# Patient Record
Sex: FEMALE | Race: WHITE | NOT HISPANIC OR LATINO | URBAN - METROPOLITAN AREA
[De-identification: names, ages, dates, MRNs, and addresses within clinical notes are randomized per-mention and may not be internally consistent; named-entity substitution may affect disease eponyms.]

---

## 2017-01-19 ENCOUNTER — APPOINTMENT (OUTPATIENT)
Dept: URBAN - METROPOLITAN AREA SURGERY 9 | Age: 72
Setting detail: DERMATOLOGY
End: 2017-01-20

## 2017-01-19 DIAGNOSIS — L91.8 OTHER HYPERTROPHIC DISORDERS OF THE SKIN: ICD-10-CM

## 2017-01-19 DIAGNOSIS — L82.1 OTHER SEBORRHEIC KERATOSIS: ICD-10-CM

## 2017-01-19 DIAGNOSIS — Z85.828 PERSONAL HISTORY OF OTHER MALIGNANT NEOPLASM OF SKIN: ICD-10-CM

## 2017-01-19 DIAGNOSIS — D17 BENIGN LIPOMATOUS NEOPLASM: ICD-10-CM

## 2017-01-19 DIAGNOSIS — L43.8 OTHER LICHEN PLANUS: ICD-10-CM

## 2017-01-19 DIAGNOSIS — L57.0 ACTINIC KERATOSIS: ICD-10-CM

## 2017-01-19 PROBLEM — D48.5 NEOPLASM OF UNCERTAIN BEHAVIOR OF SKIN: Status: ACTIVE | Noted: 2017-01-19

## 2017-01-19 PROBLEM — D17.20 BENIGN LIPOMATOUS NEOPLASM OF SKIN AND SUBCUTANEOUS TISSUE OF UNSPECIFIED LIMB: Status: ACTIVE | Noted: 2017-01-19

## 2017-01-19 PROCEDURE — OTHER BIOPSY BY SHAVE METHOD: OTHER

## 2017-01-19 PROCEDURE — 88305 TISSUE EXAM BY PATHOLOGIST: CPT | Mod: TC

## 2017-01-19 PROCEDURE — OTHER REASSURANCE: OTHER

## 2017-01-19 PROCEDURE — 11100: CPT

## 2017-01-19 PROCEDURE — 99213 OFFICE O/P EST LOW 20 MIN: CPT | Mod: 25

## 2017-01-19 PROCEDURE — OTHER OTHER: OTHER

## 2017-01-19 PROCEDURE — OTHER PATHOLOGY BILLING: OTHER

## 2017-01-19 ASSESSMENT — LOCATION SIMPLE DESCRIPTION DERM
LOCATION SIMPLE: POSTERIOR SCALP
LOCATION SIMPLE: UPPER BACK
LOCATION SIMPLE: LEFT NOSE
LOCATION SIMPLE: LEFT PRETIBIAL REGION
LOCATION SIMPLE: RIGHT POSTERIOR AXILLA

## 2017-01-19 ASSESSMENT — LOCATION ZONE DERM
LOCATION ZONE: SCALP
LOCATION ZONE: LEG
LOCATION ZONE: AXILLAE
LOCATION ZONE: NOSE
LOCATION ZONE: TRUNK

## 2017-01-19 ASSESSMENT — LOCATION DETAILED DESCRIPTION DERM
LOCATION DETAILED: LEFT NASAL ALA
LOCATION DETAILED: LEFT DISTAL PRETIBIAL REGION
LOCATION DETAILED: INFERIOR THORACIC SPINE
LOCATION DETAILED: POSTERIOR MID-PARIETAL SCALP
LOCATION DETAILED: RIGHT POSTERIOR AXILLA

## 2017-01-19 NOTE — PROCEDURE: REASSURANCE
Detail Level: Simple
Include Location In Plan?: No
Detail Level: Generalized
Detail Level: Detailed
Additional Note: On top of superficial lipoma

## 2017-01-19 NOTE — PROCEDURE: OTHER
Detail Level: Simple
Other (Free Text): Left leg did clear.
Note Text (......Xxx Chief Complaint.): This diagnosis correlates with the

## 2017-03-08 ENCOUNTER — APPOINTMENT (OUTPATIENT)
Dept: URBAN - METROPOLITAN AREA SURGERY 9 | Age: 72
Setting detail: DERMATOLOGY
End: 2017-03-09

## 2017-03-08 VITALS — HEART RATE: 72 BPM | SYSTOLIC BLOOD PRESSURE: 132 MMHG | RESPIRATION RATE: 15 BRPM | DIASTOLIC BLOOD PRESSURE: 80 MMHG

## 2017-03-08 PROBLEM — C44.311 BASAL CELL CARCINOMA OF SKIN OF NOSE: Status: ACTIVE | Noted: 2017-03-08

## 2017-03-08 PROCEDURE — 17312 MOHS ADDL STAGE: CPT

## 2017-03-08 PROCEDURE — 17311 MOHS 1 STAGE H/N/HF/G: CPT

## 2017-03-08 PROCEDURE — OTHER CONSULTATION FOR MOHS SURGERY: OTHER

## 2017-03-08 PROCEDURE — OTHER MOHS SURGERY: OTHER

## 2017-03-08 PROCEDURE — OTHER RETURN TO REFERRING PROVIDER: OTHER

## 2017-03-08 NOTE — PROCEDURE: MOHS SURGERY
Body Location Override (Optional - Billing Will Still Be Based On Selected Body Map Location If Applicable): Left nasal supratip

## 2017-03-08 NOTE — PROCEDURE: CONSULTATION FOR MOHS SURGERY
Detail Level: Detailed
Body Location Override (Optional - Billing Will Still Be Based On Selected Body Map Location If Applicable): Left nasal supratip
X Size Of Lesion In Cm (Optional): 0
Name Of The Referring Provider For Procedure: Dr. Ellis
Incorporate Mauc In Note: No
Size Of Lesion: -

## 2017-03-17 ENCOUNTER — APPOINTMENT (OUTPATIENT)
Dept: URBAN - METROPOLITAN AREA SURGERY 9 | Age: 72
Setting detail: DERMATOLOGY
End: 2017-03-17

## 2017-03-17 DIAGNOSIS — Z48.817 ENCOUNTER FOR SURGICAL AFTERCARE FOLLOWING SURGERY ON THE SKIN AND SUBCUTANEOUS TISSUE: ICD-10-CM

## 2017-03-17 PROCEDURE — 99212 OFFICE O/P EST SF 10 MIN: CPT

## 2017-03-17 PROCEDURE — OTHER POST-OP WOUND CHECK (NO GLOBAL PERIOD): OTHER

## 2017-03-17 ASSESSMENT — LOCATION DETAILED DESCRIPTION DERM: LOCATION DETAILED: NASAL DORSUM

## 2017-03-17 ASSESSMENT — LOCATION SIMPLE DESCRIPTION DERM: LOCATION SIMPLE: NOSE

## 2017-03-17 ASSESSMENT — LOCATION ZONE DERM: LOCATION ZONE: NOSE

## 2017-03-17 NOTE — PROCEDURE: POST-OP WOUND CHECK (NO GLOBAL PERIOD)
Detail Level: Zone
Wound Evaluated By: Peggy Rivas MD
Body Location Override (Optional - Billing Will Still Be Based On Selected Body Map Location If Applicable): Left nasal supratip

## 2017-07-27 ENCOUNTER — APPOINTMENT (OUTPATIENT)
Dept: URBAN - METROPOLITAN AREA SURGERY 9 | Age: 72
Setting detail: DERMATOLOGY
End: 2017-07-27

## 2017-07-27 DIAGNOSIS — Z85.828 PERSONAL HISTORY OF OTHER MALIGNANT NEOPLASM OF SKIN: ICD-10-CM

## 2017-07-27 DIAGNOSIS — L82.1 OTHER SEBORRHEIC KERATOSIS: ICD-10-CM

## 2017-07-27 DIAGNOSIS — L43.8 OTHER LICHEN PLANUS: ICD-10-CM

## 2017-07-27 PROCEDURE — OTHER OTHER: OTHER

## 2017-07-27 PROCEDURE — OTHER REASSURANCE: OTHER

## 2017-07-27 PROCEDURE — OTHER TREATMENT REGIMEN: OTHER

## 2017-07-27 PROCEDURE — OTHER PRESCRIPTION: OTHER

## 2017-07-27 PROCEDURE — 99214 OFFICE O/P EST MOD 30 MIN: CPT

## 2017-07-27 RX ORDER — CLOBETASOL PROPIONATE 0.05 %
CREAM (GRAM) TOPICAL
Qty: 1 | Refills: 1 | Status: ERX

## 2017-07-27 ASSESSMENT — LOCATION DETAILED DESCRIPTION DERM
LOCATION DETAILED: RIGHT SUPERIOR MEDIAL UPPER BACK
LOCATION DETAILED: LEFT DISTAL PRETIBIAL REGION
LOCATION DETAILED: LEFT NASAL ALA
LOCATION DETAILED: INFERIOR THORACIC SPINE
LOCATION DETAILED: RIGHT PROXIMAL DORSAL FOREARM
LOCATION DETAILED: POSTERIOR MID-PARIETAL SCALP
LOCATION DETAILED: LEFT NASAL TIP
LOCATION DETAILED: LEFT DISTAL DORSAL FOREARM
LOCATION DETAILED: RIGHT DISTAL PRETIBIAL REGION

## 2017-07-27 ASSESSMENT — LOCATION ZONE DERM
LOCATION ZONE: NOSE
LOCATION ZONE: TRUNK
LOCATION ZONE: SCALP
LOCATION ZONE: ARM
LOCATION ZONE: LEG

## 2017-07-27 ASSESSMENT — LOCATION SIMPLE DESCRIPTION DERM
LOCATION SIMPLE: LEFT NOSE
LOCATION SIMPLE: RIGHT PRETIBIAL REGION
LOCATION SIMPLE: POSTERIOR SCALP
LOCATION SIMPLE: UPPER BACK
LOCATION SIMPLE: RIGHT UPPER BACK
LOCATION SIMPLE: RIGHT FOREARM
LOCATION SIMPLE: LEFT FOREARM
LOCATION SIMPLE: LEFT PRETIBIAL REGION
LOCATION SIMPLE: NOSE

## 2017-07-27 NOTE — PROCEDURE: OTHER
Note Text (......Xxx Chief Complaint.): This diagnosis correlates with the
Other (Free Text): Left shin could be LK's
Detail Level: Simple

## 2018-01-26 ENCOUNTER — APPOINTMENT (OUTPATIENT)
Dept: URBAN - METROPOLITAN AREA SURGERY 9 | Age: 73
Setting detail: DERMATOLOGY
End: 2018-01-29

## 2018-01-26 DIAGNOSIS — L81.4 OTHER MELANIN HYPERPIGMENTATION: ICD-10-CM

## 2018-01-26 DIAGNOSIS — Z85.828 PERSONAL HISTORY OF OTHER MALIGNANT NEOPLASM OF SKIN: ICD-10-CM

## 2018-01-26 DIAGNOSIS — L43.8 OTHER LICHEN PLANUS: ICD-10-CM

## 2018-01-26 DIAGNOSIS — L82.1 OTHER SEBORRHEIC KERATOSIS: ICD-10-CM

## 2018-01-26 DIAGNOSIS — L91.8 OTHER HYPERTROPHIC DISORDERS OF THE SKIN: ICD-10-CM

## 2018-01-26 PROBLEM — D48.5 NEOPLASM OF UNCERTAIN BEHAVIOR OF SKIN: Status: ACTIVE | Noted: 2018-01-26

## 2018-01-26 PROCEDURE — OTHER BIOPSY BY SHAVE METHOD: OTHER

## 2018-01-26 PROCEDURE — 99213 OFFICE O/P EST LOW 20 MIN: CPT | Mod: 25

## 2018-01-26 PROCEDURE — OTHER TREATMENT REGIMEN: OTHER

## 2018-01-26 PROCEDURE — 88305 TISSUE EXAM BY PATHOLOGIST: CPT | Mod: TC

## 2018-01-26 PROCEDURE — OTHER OTHER: OTHER

## 2018-01-26 PROCEDURE — OTHER SKIN TAG REMOVAL (COSMETIC): OTHER

## 2018-01-26 PROCEDURE — OTHER REASSURANCE: OTHER

## 2018-01-26 PROCEDURE — OTHER PATHOLOGY BILLING: OTHER

## 2018-01-26 PROCEDURE — 11100: CPT

## 2018-01-26 ASSESSMENT — LOCATION SIMPLE DESCRIPTION DERM
LOCATION SIMPLE: UPPER BACK
LOCATION SIMPLE: LEFT FOREARM
LOCATION SIMPLE: LEFT PRETIBIAL REGION
LOCATION SIMPLE: RIGHT UPPER BACK
LOCATION SIMPLE: NOSE
LOCATION SIMPLE: LEFT NOSE
LOCATION SIMPLE: RIGHT LOWER BACK
LOCATION SIMPLE: RIGHT FOREARM
LOCATION SIMPLE: RIGHT AXILLARY VAULT
LOCATION SIMPLE: RIGHT PRETIBIAL REGION
LOCATION SIMPLE: POSTERIOR SCALP

## 2018-01-26 ASSESSMENT — LOCATION DETAILED DESCRIPTION DERM
LOCATION DETAILED: RIGHT PROXIMAL DORSAL FOREARM
LOCATION DETAILED: LEFT DISTAL DORSAL FOREARM
LOCATION DETAILED: RIGHT AXILLARY VAULT
LOCATION DETAILED: LEFT DISTAL PRETIBIAL REGION
LOCATION DETAILED: LEFT NASAL ALA
LOCATION DETAILED: LEFT NASAL TIP
LOCATION DETAILED: POSTERIOR MID-PARIETAL SCALP
LOCATION DETAILED: RIGHT DISTAL PRETIBIAL REGION
LOCATION DETAILED: INFERIOR THORACIC SPINE
LOCATION DETAILED: RIGHT INFERIOR MEDIAL MIDBACK
LOCATION DETAILED: RIGHT SUPERIOR MEDIAL UPPER BACK

## 2018-01-26 ASSESSMENT — LOCATION ZONE DERM
LOCATION ZONE: ARM
LOCATION ZONE: LEG
LOCATION ZONE: SCALP
LOCATION ZONE: AXILLAE
LOCATION ZONE: TRUNK
LOCATION ZONE: NOSE

## 2018-01-26 NOTE — PROCEDURE: SKIN TAG REMOVAL (COSMETIC)
Price (Use Numbers Only, No Special Characters Or $): 60
Detail Level: Detailed
Consent: Written consent obtained and the risks of skin tag removal was reviewed with the patient including but not limited to bleeding, pigmentary change, infection, pain, and remote possibility of scarring.
Anesthesia Type: 1% lidocaine with epinephrine
Removed With: gradle excision
Anesthesia Volume In Cc: 0.3

## 2018-01-26 NOTE — PROCEDURE: OTHER
Note Text (......Xxx Chief Complaint.): This diagnosis correlates with the
Detail Level: Simple
Other (Free Text): Left shin could be LK's

## 2018-09-11 ENCOUNTER — APPOINTMENT (OUTPATIENT)
Dept: URBAN - METROPOLITAN AREA SURGERY 9 | Age: 73
Setting detail: DERMATOLOGY
End: 2018-09-12

## 2018-09-11 DIAGNOSIS — Z85.828 PERSONAL HISTORY OF OTHER MALIGNANT NEOPLASM OF SKIN: ICD-10-CM

## 2018-09-11 DIAGNOSIS — L81.4 OTHER MELANIN HYPERPIGMENTATION: ICD-10-CM

## 2018-09-11 DIAGNOSIS — L43.8 OTHER LICHEN PLANUS: ICD-10-CM

## 2018-09-11 DIAGNOSIS — L82.1 OTHER SEBORRHEIC KERATOSIS: ICD-10-CM

## 2018-09-11 PROCEDURE — OTHER REASSURANCE: OTHER

## 2018-09-11 PROCEDURE — 99214 OFFICE O/P EST MOD 30 MIN: CPT

## 2018-09-11 PROCEDURE — OTHER PRESCRIPTION: OTHER

## 2018-09-11 PROCEDURE — OTHER TREATMENT REGIMEN: OTHER

## 2018-09-11 RX ORDER — CLOBETASOL PROPIONATE 0.5 MG/G
OINTMENT TOPICAL
Qty: 1 | Refills: 1 | Status: ERX | COMMUNITY
Start: 2018-09-11

## 2018-09-11 ASSESSMENT — LOCATION DETAILED DESCRIPTION DERM
LOCATION DETAILED: RIGHT DISTAL PRETIBIAL REGION
LOCATION DETAILED: LEFT DISTAL DORSAL FOREARM
LOCATION DETAILED: RIGHT INFERIOR MEDIAL MIDBACK
LOCATION DETAILED: POSTERIOR MID-PARIETAL SCALP
LOCATION DETAILED: LEFT DISTAL PRETIBIAL REGION
LOCATION DETAILED: INFERIOR THORACIC SPINE
LOCATION DETAILED: RIGHT PROXIMAL DORSAL FOREARM
LOCATION DETAILED: LEFT NASAL ALA
LOCATION DETAILED: LEFT MEDIAL UPPER BACK
LOCATION DETAILED: LEFT NASAL TIP

## 2018-09-11 ASSESSMENT — LOCATION SIMPLE DESCRIPTION DERM
LOCATION SIMPLE: LEFT FOREARM
LOCATION SIMPLE: RIGHT PRETIBIAL REGION
LOCATION SIMPLE: LEFT PRETIBIAL REGION
LOCATION SIMPLE: NOSE
LOCATION SIMPLE: RIGHT LOWER BACK
LOCATION SIMPLE: LEFT NOSE
LOCATION SIMPLE: POSTERIOR SCALP
LOCATION SIMPLE: LEFT UPPER BACK
LOCATION SIMPLE: UPPER BACK
LOCATION SIMPLE: RIGHT FOREARM

## 2018-09-11 ASSESSMENT — LOCATION ZONE DERM
LOCATION ZONE: ARM
LOCATION ZONE: NOSE
LOCATION ZONE: LEG
LOCATION ZONE: TRUNK
LOCATION ZONE: SCALP

## 2019-09-30 ENCOUNTER — APPOINTMENT (OUTPATIENT)
Dept: URBAN - METROPOLITAN AREA SURGERY 9 | Age: 74
Setting detail: DERMATOLOGY
End: 2019-10-01

## 2019-09-30 DIAGNOSIS — D36.1 BENIGN NEOPLASM OF PERIPHERAL NERVES AND AUTONOMIC NERVOUS SYSTEM: ICD-10-CM

## 2019-09-30 DIAGNOSIS — Z85.828 PERSONAL HISTORY OF OTHER MALIGNANT NEOPLASM OF SKIN: ICD-10-CM

## 2019-09-30 DIAGNOSIS — L82.1 OTHER SEBORRHEIC KERATOSIS: ICD-10-CM

## 2019-09-30 DIAGNOSIS — L81.4 OTHER MELANIN HYPERPIGMENTATION: ICD-10-CM

## 2019-09-30 DIAGNOSIS — R20.2 PARESTHESIA OF SKIN: ICD-10-CM

## 2019-09-30 DIAGNOSIS — L43.8 OTHER LICHEN PLANUS: ICD-10-CM

## 2019-09-30 DIAGNOSIS — L57.0 ACTINIC KERATOSIS: ICD-10-CM

## 2019-09-30 PROBLEM — D36.12 BENIGN NEOPLASM OF PERIPHERAL NERVES AND AUTONOMIC NERVOUS SYSTEM, UPPER LIMB, INCLUDING SHOULDER: Status: ACTIVE | Noted: 2019-09-30

## 2019-09-30 PROCEDURE — OTHER REASSURANCE: OTHER

## 2019-09-30 PROCEDURE — OTHER TREATMENT REGIMEN: OTHER

## 2019-09-30 PROCEDURE — 99214 OFFICE O/P EST MOD 30 MIN: CPT

## 2019-09-30 PROCEDURE — OTHER PRESCRIPTION: OTHER

## 2019-09-30 PROCEDURE — OTHER COUNSELING: OTHER

## 2019-09-30 RX ORDER — CLOBETASOL PROPIONATE 0.5 MG/G
OINTMENT TOPICAL
Qty: 1 | Refills: 1 | Status: ERX

## 2019-09-30 RX ORDER — FLUOROURACIL 50 MG/G
CREAM TOPICAL
Qty: 1 | Refills: 0 | Status: ERX | COMMUNITY
Start: 2019-09-30

## 2019-09-30 ASSESSMENT — LOCATION DETAILED DESCRIPTION DERM
LOCATION DETAILED: LEFT INFERIOR UPPER BACK
LOCATION DETAILED: NASAL SUPRATIP
LOCATION DETAILED: LEFT MEDIAL UPPER BACK
LOCATION DETAILED: LEFT DISTAL PRETIBIAL REGION
LOCATION DETAILED: LEFT UPPER CUTANEOUS LIP
LOCATION DETAILED: INFERIOR THORACIC SPINE
LOCATION DETAILED: LEFT NASAL ALA
LOCATION DETAILED: RIGHT DISTAL PRETIBIAL REGION
LOCATION DETAILED: LEFT NASAL TIP
LOCATION DETAILED: PHILTRUM
LOCATION DETAILED: RIGHT INFERIOR MEDIAL MIDBACK
LOCATION DETAILED: RIGHT ANTERIOR MEDIAL PROXIMAL UPPER ARM
LOCATION DETAILED: POSTERIOR MID-PARIETAL SCALP
LOCATION DETAILED: LEFT DORSAL MIDDLE FINGER METACARPOPHALANGEAL JOINT

## 2019-09-30 ASSESSMENT — LOCATION SIMPLE DESCRIPTION DERM
LOCATION SIMPLE: RIGHT LOWER BACK
LOCATION SIMPLE: POSTERIOR SCALP
LOCATION SIMPLE: LEFT PRETIBIAL REGION
LOCATION SIMPLE: LEFT HAND
LOCATION SIMPLE: UPPER LIP
LOCATION SIMPLE: LEFT NOSE
LOCATION SIMPLE: UPPER BACK
LOCATION SIMPLE: RIGHT PRETIBIAL REGION
LOCATION SIMPLE: RIGHT UPPER ARM
LOCATION SIMPLE: LEFT UPPER BACK
LOCATION SIMPLE: NOSE
LOCATION SIMPLE: LEFT LIP

## 2019-09-30 ASSESSMENT — LOCATION ZONE DERM
LOCATION ZONE: TRUNK
LOCATION ZONE: LIP
LOCATION ZONE: ARM
LOCATION ZONE: HAND
LOCATION ZONE: SCALP
LOCATION ZONE: NOSE
LOCATION ZONE: LEG

## 2019-09-30 NOTE — HPI: SKIN LESION
How Severe Is Your Skin Lesion?: mild
Has Your Skin Lesion Been Treated?: not been treated
Is This A New Presentation, Or A Follow-Up?: Skin Lesion
Is This A New Presentation, Or A Follow-Up?: Growth
Additional History: Patient would like to discuss removal options. Patient states that lesion I’d “annoying with shaving”

## 2019-09-30 NOTE — PROCEDURE: TREATMENT REGIMEN
Continue Regimen: Clobetasol cream as directed. Patient only needing to use 4 times a year. Arms have cleared
Detail Level: Zone
Detail Level: Simple
Initiate Treatment: 5FU once daily for 2-3 weeks to spots on nasal tip and spots on upper lip

## 2019-12-04 ENCOUNTER — APPOINTMENT (OUTPATIENT)
Dept: URBAN - METROPOLITAN AREA SURGERY 9 | Age: 74
Setting detail: DERMATOLOGY
End: 2019-12-04

## 2019-12-04 DIAGNOSIS — R60.0 LOCALIZED EDEMA: ICD-10-CM

## 2019-12-04 DIAGNOSIS — I87.2 VENOUS INSUFFICIENCY (CHRONIC) (PERIPHERAL): ICD-10-CM

## 2019-12-04 PROBLEM — L30.9 DERMATITIS, UNSPECIFIED: Status: ACTIVE | Noted: 2019-12-04

## 2019-12-04 PROCEDURE — 99213 OFFICE O/P EST LOW 20 MIN: CPT

## 2019-12-04 PROCEDURE — OTHER TREATMENT REGIMEN: OTHER

## 2019-12-04 PROCEDURE — OTHER OTHER: OTHER

## 2019-12-04 ASSESSMENT — LOCATION ZONE DERM
LOCATION ZONE: FEET
LOCATION ZONE: LEG

## 2019-12-04 ASSESSMENT — LOCATION DETAILED DESCRIPTION DERM
LOCATION DETAILED: RIGHT DISTAL PRETIBIAL REGION
LOCATION DETAILED: LEFT PROXIMAL PRETIBIAL REGION
LOCATION DETAILED: LEFT DORSAL FOOT
LOCATION DETAILED: RIGHT PROXIMAL PRETIBIAL REGION
LOCATION DETAILED: RIGHT DORSAL FOOT
LOCATION DETAILED: LEFT DISTAL PRETIBIAL REGION

## 2019-12-04 ASSESSMENT — LOCATION SIMPLE DESCRIPTION DERM
LOCATION SIMPLE: LEFT PRETIBIAL REGION
LOCATION SIMPLE: LEFT FOOT
LOCATION SIMPLE: RIGHT PRETIBIAL REGION
LOCATION SIMPLE: RIGHT FOOT

## 2019-12-04 NOTE — PROCEDURE: OTHER
Other (Free Text): Patient to follow up with primary care physician. She’s having difficulty with leg elevation due to her recent surgery and shoulder discomfort with certain positions.
Note Text (......Xxx Chief Complaint.): This diagnosis correlates with the
Detail Level: Simple

## 2019-12-04 NOTE — PROCEDURE: TREATMENT REGIMEN
Initiate Treatment: Clobetasol apply twice daily for up to two weeks a needed for flares
Detail Level: Zone
Plan: favor stasis dermatitis. Elevate legs as often as possible\\nPatient to call for Betamethasone augmented if she has pain again upon application of the clobetasol.
Otc Regimen: Moisturize aggressively

## 2020-02-17 ENCOUNTER — APPOINTMENT (OUTPATIENT)
Dept: URBAN - METROPOLITAN AREA SURGERY 9 | Age: 75
Setting detail: DERMATOLOGY
End: 2020-02-17

## 2020-02-17 DIAGNOSIS — L43.8 OTHER LICHEN PLANUS: ICD-10-CM

## 2020-02-17 DIAGNOSIS — I87.2 VENOUS INSUFFICIENCY (CHRONIC) (PERIPHERAL): ICD-10-CM

## 2020-02-17 DIAGNOSIS — L57.0 ACTINIC KERATOSIS: ICD-10-CM

## 2020-02-17 PROCEDURE — OTHER TREATMENT REGIMEN: OTHER

## 2020-02-17 PROCEDURE — 99213 OFFICE O/P EST LOW 20 MIN: CPT

## 2020-02-17 ASSESSMENT — LOCATION SIMPLE DESCRIPTION DERM
LOCATION SIMPLE: UPPER LIP
LOCATION SIMPLE: RIGHT PRETIBIAL REGION
LOCATION SIMPLE: LEFT PRETIBIAL REGION
LOCATION SIMPLE: NOSE
LOCATION SIMPLE: LEFT LIP

## 2020-02-17 ASSESSMENT — LOCATION ZONE DERM
LOCATION ZONE: LIP
LOCATION ZONE: NOSE
LOCATION ZONE: LEG

## 2020-02-17 ASSESSMENT — LOCATION DETAILED DESCRIPTION DERM
LOCATION DETAILED: NASAL DORSUM
LOCATION DETAILED: LEFT UPPER CUTANEOUS LIP
LOCATION DETAILED: PHILTRUM
LOCATION DETAILED: RIGHT DISTAL PRETIBIAL REGION
LOCATION DETAILED: LEFT DISTAL PRETIBIAL REGION

## 2020-02-17 NOTE — PROCEDURE: TREATMENT REGIMEN
Detail Level: Zone
Otc Regimen: Continue moisturizing
Continue Regimen: Elevating legs, continue to follow with vein doctor and PCP for management
Continue Regimen: Clobetasol cream as directed. Patient only needing to use 4 times a year. Arms have cleared
Continue Regimen: 5FU once daily for 2-3 weeks to spots on nasal tip and spots on upper lip (discussed with patient areas that need treatment using hand held mirror)
Detail Level: Simple

## 2020-07-20 ENCOUNTER — APPOINTMENT (OUTPATIENT)
Dept: URBAN - METROPOLITAN AREA SURGERY 9 | Age: 75
Setting detail: DERMATOLOGY
End: 2020-07-21

## 2020-07-20 DIAGNOSIS — L57.0 ACTINIC KERATOSIS: ICD-10-CM

## 2020-07-20 DIAGNOSIS — L81.4 OTHER MELANIN HYPERPIGMENTATION: ICD-10-CM

## 2020-07-20 DIAGNOSIS — L43.8 OTHER LICHEN PLANUS: ICD-10-CM

## 2020-07-20 DIAGNOSIS — Z85.828 PERSONAL HISTORY OF OTHER MALIGNANT NEOPLASM OF SKIN: ICD-10-CM

## 2020-07-20 PROCEDURE — OTHER TREATMENT REGIMEN: OTHER

## 2020-07-20 PROCEDURE — OTHER REASSURANCE: OTHER

## 2020-07-20 PROCEDURE — 99214 OFFICE O/P EST MOD 30 MIN: CPT

## 2020-07-20 ASSESSMENT — LOCATION ZONE DERM
LOCATION ZONE: LEG
LOCATION ZONE: LIP
LOCATION ZONE: NOSE
LOCATION ZONE: SCALP
LOCATION ZONE: TRUNK

## 2020-07-20 ASSESSMENT — LOCATION DETAILED DESCRIPTION DERM
LOCATION DETAILED: LEFT DISTAL PRETIBIAL REGION
LOCATION DETAILED: INFERIOR THORACIC SPINE
LOCATION DETAILED: POSTERIOR MID-PARIETAL SCALP
LOCATION DETAILED: SUPERIOR THORACIC SPINE
LOCATION DETAILED: NASAL SUPRATIP
LOCATION DETAILED: PHILTRUM
LOCATION DETAILED: LEFT UPPER CUTANEOUS LIP
LOCATION DETAILED: RIGHT DISTAL PRETIBIAL REGION
LOCATION DETAILED: LEFT NASAL ALA

## 2020-07-20 ASSESSMENT — LOCATION SIMPLE DESCRIPTION DERM
LOCATION SIMPLE: POSTERIOR SCALP
LOCATION SIMPLE: LEFT LIP
LOCATION SIMPLE: LEFT NOSE
LOCATION SIMPLE: UPPER LIP
LOCATION SIMPLE: LEFT PRETIBIAL REGION
LOCATION SIMPLE: RIGHT PRETIBIAL REGION
LOCATION SIMPLE: NOSE
LOCATION SIMPLE: UPPER BACK

## 2020-07-20 NOTE — PROCEDURE: TREATMENT REGIMEN
Continue Regimen: Clobetasol cream as directed. Patient only needing to use 4 times a year. Arms have cleared
Detail Level: Zone
Continue Regimen: 5FU increase volume of application once a day for 2-3 weeks to spots on nasal tip and spots on upper lip (discussed with patient areas that need treatment using hand held mirror).\\nIf not reacting more after a week, increase to twice daily
Detail Level: Simple

## 2020-11-04 ENCOUNTER — APPOINTMENT (OUTPATIENT)
Dept: URBAN - METROPOLITAN AREA SURGERY 9 | Age: 75
Setting detail: DERMATOLOGY
End: 2020-11-04

## 2020-11-04 DIAGNOSIS — L81.4 OTHER MELANIN HYPERPIGMENTATION: ICD-10-CM

## 2020-11-04 DIAGNOSIS — L82.0 INFLAMED SEBORRHEIC KERATOSIS: ICD-10-CM

## 2020-11-04 DIAGNOSIS — L57.0 ACTINIC KERATOSIS: ICD-10-CM

## 2020-11-04 PROCEDURE — 17000 DESTRUCT PREMALG LESION: CPT | Mod: 59

## 2020-11-04 PROCEDURE — OTHER LIQUID NITROGEN (COSMETIC): OTHER

## 2020-11-04 PROCEDURE — OTHER LIQUID NITROGEN: OTHER

## 2020-11-04 PROCEDURE — 17110 DESTRUCT B9 LESION 1-14: CPT

## 2020-11-04 ASSESSMENT — LOCATION SIMPLE DESCRIPTION DERM
LOCATION SIMPLE: LEFT HAND
LOCATION SIMPLE: ANTERIOR SCALP
LOCATION SIMPLE: LEFT FOREHEAD

## 2020-11-04 ASSESSMENT — LOCATION ZONE DERM
LOCATION ZONE: SCALP
LOCATION ZONE: FACE
LOCATION ZONE: HAND

## 2020-11-04 ASSESSMENT — LOCATION DETAILED DESCRIPTION DERM
LOCATION DETAILED: MID-FRONTAL SCALP
LOCATION DETAILED: LEFT SUPERIOR MEDIAL FOREHEAD
LOCATION DETAILED: LEFT RADIAL DORSAL HAND

## 2020-11-04 NOTE — PROCEDURE: LIQUID NITROGEN
Include Z78.9 (Other Specified Conditions Influencing Health Status) As An Associated Diagnosis?: No
Consent: The patient's consent was obtained including but not limited to risks of pain, crusting, blistering, scarring.
Detail Level: Detailed
Medical Necessity Clause: This procedure was medically necessary because the lesions that were treated were:
Post-Care Instructions: Pt may apply Vaseline to crusted or scabbing areas.
Medical Necessity Information: It is in your best interest to select a reason for this procedure from the list below. All of these items fulfill various CMS LCD requirements except the new and changing color options.
Number Of Freeze-Thaw Cycles: 1 freeze-thaw cycle
Detail Level: Simple
Consent: The patient's consent was obtained including but not limited to risks of crusting, blistering, scarring, pigmentary change.
Duration Of Freeze Thaw-Cycle (Seconds): 5

## 2020-11-04 NOTE — PROCEDURE: LIQUID NITROGEN (COSMETIC)
Price (Use Numbers Only, No Special Characters Or $): 50
Consent: The patient's consent was obtained including but not limited to risks of crusting, blistering, scarring, pigmentary change, recurrence, incomplete removal. The patient understands that the procedure is cosmetic in nature and is not covered by insurance.
Render Post-Care Instructions In Note?: no
Post-Care Instructions: I reviewed with the patient in detail post-care instructions. Patient is to wear sunprotection, and avoid picking at any of the treated lesions. Pt may apply Vaseline to crusted or scabbing areas.
Detail Level: Simple
Billing Information: Bill by Static Price

## 2021-01-15 ENCOUNTER — APPOINTMENT (OUTPATIENT)
Dept: URBAN - METROPOLITAN AREA SURGERY 9 | Age: 76
Setting detail: DERMATOLOGY
End: 2021-01-15

## 2021-01-15 DIAGNOSIS — L82.0 INFLAMED SEBORRHEIC KERATOSIS: ICD-10-CM

## 2021-01-15 DIAGNOSIS — L43.8 OTHER LICHEN PLANUS: ICD-10-CM

## 2021-01-15 DIAGNOSIS — Z85.828 PERSONAL HISTORY OF OTHER MALIGNANT NEOPLASM OF SKIN: ICD-10-CM

## 2021-01-15 DIAGNOSIS — L81.4 OTHER MELANIN HYPERPIGMENTATION: ICD-10-CM

## 2021-01-15 DIAGNOSIS — L57.0 ACTINIC KERATOSIS: ICD-10-CM

## 2021-01-15 PROCEDURE — OTHER TREATMENT REGIMEN: OTHER

## 2021-01-15 PROCEDURE — OTHER SUNSCREEN RECOMMENDATIONS: OTHER

## 2021-01-15 PROCEDURE — 99214 OFFICE O/P EST MOD 30 MIN: CPT

## 2021-01-15 PROCEDURE — OTHER COUNSELING: OTHER

## 2021-01-15 PROCEDURE — OTHER REASSURANCE: OTHER

## 2021-01-15 PROCEDURE — OTHER PRESCRIPTION MEDICATION MANAGEMENT: OTHER

## 2021-01-15 ASSESSMENT — LOCATION SIMPLE DESCRIPTION DERM
LOCATION SIMPLE: RIGHT FOREHEAD
LOCATION SIMPLE: RIGHT PRETIBIAL REGION
LOCATION SIMPLE: NOSE
LOCATION SIMPLE: RIGHT THIGH
LOCATION SIMPLE: LEFT NOSE
LOCATION SIMPLE: LEFT PRETIBIAL REGION
LOCATION SIMPLE: UPPER BACK
LOCATION SIMPLE: POSTERIOR SCALP

## 2021-01-15 ASSESSMENT — LOCATION ZONE DERM
LOCATION ZONE: NOSE
LOCATION ZONE: FACE
LOCATION ZONE: TRUNK
LOCATION ZONE: SCALP
LOCATION ZONE: LEG

## 2021-01-15 ASSESSMENT — LOCATION DETAILED DESCRIPTION DERM
LOCATION DETAILED: LEFT DISTAL PRETIBIAL REGION
LOCATION DETAILED: POSTERIOR MID-PARIETAL SCALP
LOCATION DETAILED: RIGHT SUPERIOR MEDIAL FOREHEAD
LOCATION DETAILED: RIGHT ANTERIOR LATERAL PROXIMAL THIGH
LOCATION DETAILED: INFERIOR THORACIC SPINE
LOCATION DETAILED: NASAL TIP
LOCATION DETAILED: LEFT NASAL ALA
LOCATION DETAILED: NASAL SUPRATIP
LOCATION DETAILED: RIGHT DISTAL PRETIBIAL REGION
LOCATION DETAILED: RIGHT SUPERIOR FOREHEAD
LOCATION DETAILED: SUPERIOR THORACIC SPINE

## 2021-01-15 NOTE — PROCEDURE: PRESCRIPTION MEDICATION MANAGEMENT
Continue Regimen: Pt to use 5FU to spots on right forehead and left nasal tip- Twice a day for 3-4 wks. Does not typically get red or irritated from qd treatment.
Render In Strict Bullet Format?: No
Detail Level: Detailed

## 2021-01-15 NOTE — PROCEDURE: TREATMENT REGIMEN
Continue Regimen: Clobetasol cream as directed. Patient only needing to use 4 times a year. Arms have cleared
Detail Level: Zone

## 2021-01-15 NOTE — HPI: EVALUATION OF SKIN LESION(S)
Hpi Title: Evaluation of Skin Lesions
Additional History: Pt has been putting 5FU on the spot for about a month

## 2021-07-19 ENCOUNTER — APPOINTMENT (OUTPATIENT)
Dept: URBAN - METROPOLITAN AREA SURGERY 9 | Age: 76
Setting detail: DERMATOLOGY
End: 2021-07-19

## 2021-07-19 DIAGNOSIS — R20.2 PARESTHESIA OF SKIN: ICD-10-CM

## 2021-07-19 DIAGNOSIS — L43.8 OTHER LICHEN PLANUS: ICD-10-CM

## 2021-07-19 DIAGNOSIS — L81.4 OTHER MELANIN HYPERPIGMENTATION: ICD-10-CM

## 2021-07-19 DIAGNOSIS — L82.1 OTHER SEBORRHEIC KERATOSIS: ICD-10-CM

## 2021-07-19 DIAGNOSIS — Z85.828 PERSONAL HISTORY OF OTHER MALIGNANT NEOPLASM OF SKIN: ICD-10-CM

## 2021-07-19 PROBLEM — D48.5 NEOPLASM OF UNCERTAIN BEHAVIOR OF SKIN: Status: ACTIVE | Noted: 2021-07-19

## 2021-07-19 PROCEDURE — 11102 TANGNTL BX SKIN SINGLE LES: CPT

## 2021-07-19 PROCEDURE — OTHER PRESCRIPTION: OTHER

## 2021-07-19 PROCEDURE — OTHER COUNSELING: OTHER

## 2021-07-19 PROCEDURE — OTHER SUNSCREEN RECOMMENDATIONS: OTHER

## 2021-07-19 PROCEDURE — OTHER REASSURANCE: OTHER

## 2021-07-19 PROCEDURE — OTHER PRESCRIPTION MEDICATION MANAGEMENT: OTHER

## 2021-07-19 PROCEDURE — 99213 OFFICE O/P EST LOW 20 MIN: CPT | Mod: 25

## 2021-07-19 PROCEDURE — OTHER LIQUID NITROGEN (COSMETIC): OTHER

## 2021-07-19 PROCEDURE — OTHER BIOPSY BY SHAVE METHOD: OTHER

## 2021-07-19 RX ORDER — CLOBETASOL PROPIONATE 0.5 MG/G
OINTMENT TOPICAL
Qty: 1 | Refills: 1 | Status: ERX | COMMUNITY
Start: 2021-07-19

## 2021-07-19 ASSESSMENT — LOCATION DETAILED DESCRIPTION DERM
LOCATION DETAILED: POSTERIOR MID-PARIETAL SCALP
LOCATION DETAILED: 2ND WEB SPACE LEFT HAND
LOCATION DETAILED: LEFT DISTAL PRETIBIAL REGION
LOCATION DETAILED: LEFT INFERIOR UPPER BACK
LOCATION DETAILED: LEFT NASAL ALA
LOCATION DETAILED: SUPERIOR THORACIC SPINE
LOCATION DETAILED: NASAL SUPRATIP
LOCATION DETAILED: INFERIOR THORACIC SPINE
LOCATION DETAILED: RIGHT DISTAL PRETIBIAL REGION

## 2021-07-19 ASSESSMENT — LOCATION SIMPLE DESCRIPTION DERM
LOCATION SIMPLE: LEFT UPPER BACK
LOCATION SIMPLE: POSTERIOR SCALP
LOCATION SIMPLE: LEFT PRETIBIAL REGION
LOCATION SIMPLE: NOSE
LOCATION SIMPLE: RIGHT PRETIBIAL REGION
LOCATION SIMPLE: UPPER BACK
LOCATION SIMPLE: LEFT NOSE
LOCATION SIMPLE: LEFT HAND

## 2021-07-19 ASSESSMENT — LOCATION ZONE DERM
LOCATION ZONE: NOSE
LOCATION ZONE: SCALP
LOCATION ZONE: HAND
LOCATION ZONE: TRUNK
LOCATION ZONE: LEG

## 2021-07-19 NOTE — PROCEDURE: PRESCRIPTION MEDICATION MANAGEMENT
Continue Regimen: Clobetasol ointment as needed. Only using a few times a year
Render In Strict Bullet Format?: No
Detail Level: Zone

## 2021-07-19 NOTE — HPI: SECONDARY COMPLAINT
Additional History: Pt states this spot is bothersome to her and would like to know if anything can be done

## 2021-07-19 NOTE — HPI: EVALUATION OF SKIN LESION(S)
Hpi Title: Evaluation of a Skin Lesion
Additional History: She states this is an itchy spot that comes and goes. She has not tried any treatments on this area at this time.

## 2021-07-19 NOTE — PROCEDURE: LIQUID NITROGEN (COSMETIC)
Detail Level: Simple
Price (Use Numbers Only, No Special Characters Or $): 50
Post-Care Instructions: I reviewed with the patient in detail post-care instructions. Patient is to wear sunprotection, and avoid picking at any of the treated lesions. Pt may apply Vaseline to crusted or scabbing areas.
Consent: The patient's consent was obtained including but not limited to risks of crusting, blistering, scarring, pigmentary change, recurrence, incomplete removal. The patient understands that the procedure is cosmetic in nature and is not covered by insurance.
Billing Information: Bill by Static Price
Render Post-Care Instructions In Note?: no

## 2022-02-24 ENCOUNTER — APPOINTMENT (OUTPATIENT)
Dept: URBAN - METROPOLITAN AREA SURGERY 9 | Age: 77
Setting detail: DERMATOLOGY
End: 2022-02-24

## 2022-02-24 DIAGNOSIS — Z85.828 PERSONAL HISTORY OF OTHER MALIGNANT NEOPLASM OF SKIN: ICD-10-CM

## 2022-02-24 DIAGNOSIS — L82.0 INFLAMED SEBORRHEIC KERATOSIS: ICD-10-CM

## 2022-02-24 DIAGNOSIS — L57.0 ACTINIC KERATOSIS: ICD-10-CM

## 2022-02-24 DIAGNOSIS — L82.1 OTHER SEBORRHEIC KERATOSIS: ICD-10-CM

## 2022-02-24 DIAGNOSIS — D36.1 BENIGN NEOPLASM OF PERIPHERAL NERVES AND AUTONOMIC NERVOUS SYSTEM: ICD-10-CM

## 2022-02-24 DIAGNOSIS — L81.4 OTHER MELANIN HYPERPIGMENTATION: ICD-10-CM

## 2022-02-24 DIAGNOSIS — L43.8 OTHER LICHEN PLANUS: ICD-10-CM

## 2022-02-24 PROBLEM — D36.12 BENIGN NEOPLASM OF PERIPHERAL NERVES AND AUTONOMIC NERVOUS SYSTEM, UPPER LIMB, INCLUDING SHOULDER: Status: ACTIVE | Noted: 2022-02-24

## 2022-02-24 PROCEDURE — OTHER LIQUID NITROGEN (COSMETIC): OTHER

## 2022-02-24 PROCEDURE — OTHER REASSURANCE: OTHER

## 2022-02-24 PROCEDURE — 11301 SHAVE SKIN LESION 0.6-1.0 CM: CPT

## 2022-02-24 PROCEDURE — OTHER PRESCRIPTION MEDICATION MANAGEMENT: OTHER

## 2022-02-24 PROCEDURE — OTHER PRESCRIPTION: OTHER

## 2022-02-24 PROCEDURE — OTHER OTHER: OTHER

## 2022-02-24 PROCEDURE — OTHER SUNSCREEN RECOMMENDATIONS: OTHER

## 2022-02-24 PROCEDURE — 99214 OFFICE O/P EST MOD 30 MIN: CPT | Mod: 25

## 2022-02-24 PROCEDURE — OTHER SHAVE REMOVAL: OTHER

## 2022-02-24 RX ORDER — CLOBETASOL PROPIONATE 0.5 MG/G
OINTMENT TOPICAL
Qty: 15 | Refills: 1 | Status: ERX

## 2022-02-24 RX ORDER — FLUOROURACIL 5 MG/G
CREAM TOPICAL
Qty: 40 | Refills: 0 | Status: ERX | COMMUNITY
Start: 2022-02-24

## 2022-02-24 ASSESSMENT — LOCATION DETAILED DESCRIPTION DERM
LOCATION DETAILED: LEFT SUPERIOR PARIETAL SCALP
LOCATION DETAILED: NASAL SUPRATIP
LOCATION DETAILED: LEFT DORSAL RING FINGER METACARPOPHALANGEAL JOINT
LOCATION DETAILED: LEFT DISTAL PRETIBIAL REGION
LOCATION DETAILED: LEFT SUPERIOR UPPER BACK
LOCATION DETAILED: LEFT MID-UPPER BACK
LOCATION DETAILED: SUPERIOR THORACIC SPINE
LOCATION DETAILED: RIGHT FOREHEAD
LOCATION DETAILED: RIGHT DISTAL PRETIBIAL REGION
LOCATION DETAILED: LEFT NASAL ALA
LOCATION DETAILED: LEFT ULNAR DORSAL HAND
LOCATION DETAILED: INFERIOR THORACIC SPINE
LOCATION DETAILED: POSTERIOR MID-PARIETAL SCALP
LOCATION DETAILED: RIGHT POSTERIOR AXILLA

## 2022-02-24 ASSESSMENT — LOCATION ZONE DERM
LOCATION ZONE: LEG
LOCATION ZONE: NOSE
LOCATION ZONE: SCALP
LOCATION ZONE: AXILLAE
LOCATION ZONE: FACE
LOCATION ZONE: TRUNK
LOCATION ZONE: HAND

## 2022-02-24 ASSESSMENT — LOCATION SIMPLE DESCRIPTION DERM
LOCATION SIMPLE: UPPER BACK
LOCATION SIMPLE: RIGHT FOREHEAD
LOCATION SIMPLE: LEFT HAND
LOCATION SIMPLE: LEFT NOSE
LOCATION SIMPLE: RIGHT PRETIBIAL REGION
LOCATION SIMPLE: RIGHT POSTERIOR AXILLA
LOCATION SIMPLE: SCALP
LOCATION SIMPLE: LEFT PRETIBIAL REGION
LOCATION SIMPLE: LEFT UPPER BACK
LOCATION SIMPLE: POSTERIOR SCALP
LOCATION SIMPLE: NOSE

## 2022-02-24 NOTE — PROCEDURE: LIQUID NITROGEN (COSMETIC)
Consent: The patient's consent was obtained including but not limited to risks of crusting, blistering, scarring, pigmentary change, recurrence, incomplete removal. The patient understands that the procedure is cosmetic in nature and is not covered by insurance.
Spray Paint Text: The liquid nitrogen was applied to the skin utilizing a spray paint frosting technique.
Billing Information: Bill by Static Price
Show Spray Paint Technique Variable?: Yes
Spray Paint Technique: No
Price (Use Numbers Only, No Special Characters Or $): 75
Post-Care Instructions: I reviewed with the patient in detail post-care instructions. Patient is to wear sunprotection, and avoid picking at any of the treated lesions. Pt may apply Vaseline to crusted or scabbing areas.
Detail Level: Simple

## 2022-02-24 NOTE — PROCEDURE: PRESCRIPTION MEDICATION MANAGEMENT
Continue Regimen: Clobetasol ointment as needed. Only using a few times a year
Render In Strict Bullet Format?: No
Detail Level: Zone
Initiate Treatment: 5FU Apply once a day for 2-3 wks to spots on right upper forehead Apply bid for a month to spots on scalp
Detail Level: Detailed

## 2022-02-24 NOTE — PROCEDURE: OTHER
Detail Level: Simple
Note Text (......Xxx Chief Complaint.): This diagnosis correlates with the
Render Risk Assessment In Note?: no
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

## 2022-06-07 ENCOUNTER — APPOINTMENT (OUTPATIENT)
Dept: URBAN - METROPOLITAN AREA SURGERY 9 | Age: 77
Setting detail: DERMATOLOGY
End: 2022-06-07

## 2022-06-07 DIAGNOSIS — L23.7 ALLERGIC CONTACT DERMATITIS DUE TO PLANTS, EXCEPT FOOD: ICD-10-CM

## 2022-06-07 DIAGNOSIS — L57.0 ACTINIC KERATOSIS: ICD-10-CM

## 2022-06-07 PROCEDURE — OTHER PRESCRIPTION MEDICATION MANAGEMENT: OTHER

## 2022-06-07 PROCEDURE — OTHER PRESCRIPTION: OTHER

## 2022-06-07 PROCEDURE — 99213 OFFICE O/P EST LOW 20 MIN: CPT

## 2022-06-07 PROCEDURE — OTHER COUNSELING: OTHER

## 2022-06-07 RX ORDER — CLOBETASOL PROPIONATE 0.5 MG/G
CREAM TOPICAL BID
Qty: 60 | Refills: 1 | Status: ERX | COMMUNITY
Start: 2022-06-07

## 2022-06-07 ASSESSMENT — LOCATION SIMPLE DESCRIPTION DERM
LOCATION SIMPLE: LEFT FOREARM
LOCATION SIMPLE: RIGHT FOREARM
LOCATION SIMPLE: POSTERIOR SCALP
LOCATION SIMPLE: RIGHT PRETIBIAL REGION
LOCATION SIMPLE: NOSE
LOCATION SIMPLE: LEFT PRETIBIAL REGION

## 2022-06-07 ASSESSMENT — LOCATION ZONE DERM
LOCATION ZONE: LEG
LOCATION ZONE: SCALP
LOCATION ZONE: NOSE
LOCATION ZONE: ARM

## 2022-06-07 ASSESSMENT — LOCATION DETAILED DESCRIPTION DERM
LOCATION DETAILED: LEFT PROXIMAL DORSAL FOREARM
LOCATION DETAILED: LEFT PROXIMAL PRETIBIAL REGION
LOCATION DETAILED: RIGHT PROXIMAL DORSAL FOREARM
LOCATION DETAILED: POSTERIOR MID-PARIETAL SCALP
LOCATION DETAILED: NASAL DORSUM
LOCATION DETAILED: RIGHT PROXIMAL PRETIBIAL REGION

## 2022-06-07 NOTE — PROCEDURE: PRESCRIPTION MEDICATION MANAGEMENT
Render In Strict Bullet Format?: Yes
Plan: Also recommended Sarna anti itch lotion
Discontinue Regimen: Prednisone given by  her PCP (due to having a reaction to it).  This is now added to her Allergy list.
Initiate Treatment: Clobetasol cream bid for up to two weeks for itch rash (encouraged her to put in refrigerator as the cold application will help with itch/discomfort).  Pt also had an IM steroid injection by PCP (informed pt this is slower acting, but should be helping with rash resolution and itch control).
Detail Level: Zone
Discontinue Regimen: 5FU to nose and scalp for now until the current rash clears and then re-evaluate to see if any AK’s persist.  Pt states she thought she had a precancer on the nose, but is not sure.  Advised pt to f/u with Dr. Ellis for further instruction on re-starting 5-FU if he feels it is needed.

## 2022-10-07 ENCOUNTER — APPOINTMENT (OUTPATIENT)
Dept: URBAN - METROPOLITAN AREA SURGERY 9 | Age: 77
Setting detail: DERMATOLOGY
End: 2022-10-07

## 2022-10-07 DIAGNOSIS — L81.4 OTHER MELANIN HYPERPIGMENTATION: ICD-10-CM

## 2022-10-07 DIAGNOSIS — L57.0 ACTINIC KERATOSIS: ICD-10-CM

## 2022-10-07 DIAGNOSIS — L82.1 OTHER SEBORRHEIC KERATOSIS: ICD-10-CM

## 2022-10-07 DIAGNOSIS — L98.1 FACTITIAL DERMATITIS: ICD-10-CM

## 2022-10-07 DIAGNOSIS — L82.0 INFLAMED SEBORRHEIC KERATOSIS: ICD-10-CM

## 2022-10-07 DIAGNOSIS — L43.8 OTHER LICHEN PLANUS: ICD-10-CM

## 2022-10-07 DIAGNOSIS — Z85.828 PERSONAL HISTORY OF OTHER MALIGNANT NEOPLASM OF SKIN: ICD-10-CM

## 2022-10-07 PROBLEM — D48.5 NEOPLASM OF UNCERTAIN BEHAVIOR OF SKIN: Status: ACTIVE | Noted: 2022-10-07

## 2022-10-07 PROCEDURE — OTHER TREATMENT REGIMEN: OTHER

## 2022-10-07 PROCEDURE — 17110 DESTRUCT B9 LESION 1-14: CPT

## 2022-10-07 PROCEDURE — OTHER SUNSCREEN RECOMMENDATIONS: OTHER

## 2022-10-07 PROCEDURE — 11102 TANGNTL BX SKIN SINGLE LES: CPT | Mod: 59

## 2022-10-07 PROCEDURE — 99214 OFFICE O/P EST MOD 30 MIN: CPT | Mod: 25

## 2022-10-07 PROCEDURE — OTHER REASSURANCE: OTHER

## 2022-10-07 PROCEDURE — OTHER BIOPSY BY SHAVE METHOD: OTHER

## 2022-10-07 PROCEDURE — OTHER PRESCRIPTION MEDICATION MANAGEMENT: OTHER

## 2022-10-07 PROCEDURE — OTHER LIQUID NITROGEN: OTHER

## 2022-10-07 PROCEDURE — OTHER PRESCRIPTION: OTHER

## 2022-10-07 RX ORDER — FLUOROURACIL 5 MG/G
CREAM TOPICAL
Qty: 40 | Refills: 0 | Status: ERX

## 2022-10-07 RX ORDER — CLOBETASOL PROPIONATE 0.5 MG/G
OINTMENT TOPICAL
Qty: 15 | Refills: 1 | Status: ERX

## 2022-10-07 ASSESSMENT — LOCATION ZONE DERM
LOCATION ZONE: SCALP
LOCATION ZONE: ARM
LOCATION ZONE: LEG
LOCATION ZONE: NOSE
LOCATION ZONE: FACE
LOCATION ZONE: TRUNK

## 2022-10-07 ASSESSMENT — LOCATION SIMPLE DESCRIPTION DERM
LOCATION SIMPLE: RIGHT FOREHEAD
LOCATION SIMPLE: SCALP
LOCATION SIMPLE: LEFT NOSE
LOCATION SIMPLE: RIGHT POSTERIOR UPPER ARM
LOCATION SIMPLE: RIGHT PRETIBIAL REGION
LOCATION SIMPLE: UPPER BACK
LOCATION SIMPLE: NOSE
LOCATION SIMPLE: POSTERIOR SCALP
LOCATION SIMPLE: ANTERIOR SCALP
LOCATION SIMPLE: LEFT PRETIBIAL REGION

## 2022-10-07 ASSESSMENT — LOCATION DETAILED DESCRIPTION DERM
LOCATION DETAILED: LEFT SUPERIOR PARIETAL SCALP
LOCATION DETAILED: MID-FRONTAL SCALP
LOCATION DETAILED: RIGHT FOREHEAD
LOCATION DETAILED: NASAL SUPRATIP
LOCATION DETAILED: LEFT NASAL ALA
LOCATION DETAILED: LEFT DISTAL PRETIBIAL REGION
LOCATION DETAILED: RIGHT PROXIMAL LATERAL POSTERIOR UPPER ARM
LOCATION DETAILED: INFERIOR THORACIC SPINE
LOCATION DETAILED: POSTERIOR MID-PARIETAL SCALP
LOCATION DETAILED: SUPERIOR THORACIC SPINE
LOCATION DETAILED: RIGHT DISTAL PRETIBIAL REGION

## 2022-10-07 NOTE — PROCEDURE: LIQUID NITROGEN
Include Z78.9 (Other Specified Conditions Influencing Health Status) As An Associated Diagnosis?: No
Post-Care Instructions: Pt may apply Vaseline to crusted or scabbing areas.
Show Spray Paint Technique Variable?: Yes
Spray Paint Text: The liquid nitrogen was applied to the skin utilizing a spray paint frosting technique.
Detail Level: Detailed
Medical Necessity Information: It is in your best interest to select a reason for this procedure from the list below. All of these items fulfill various CMS LCD requirements except the new and changing color options.
Medical Necessity Clause: This procedure was medically necessary because the lesions that were treated were:
Consent: The patient's consent was obtained including but not limited to risks of pain, crusting, blistering, scarring.

## 2022-10-07 NOTE — PROCEDURE: PRESCRIPTION MEDICATION MANAGEMENT
Continue Regimen: Clobetasol ointment as needed. Only using a few times a year
Render In Strict Bullet Format?: No
Detail Level: Zone
Initiate Treatment: 5FU Apply once a day for 2-3 wks to spot on right upper forehead
Detail Level: Detailed

## 2023-02-14 NOTE — PROCEDURE: REASSURANCE
Patient called wanting to know if Dr Vasquez can place a referral for an ENT.  States she has ear wax build up.  Patient would like to see an ENT not the NP.  States she would like to have the vacuum used vs the water jet.    Patient states she has seen and ENT before but would like to go with whom ever Dr Vasquez would recommend.   
Patient made aware of Dr. Vasquez message. Advised patient to contact our office if she doesn't receive a phone call from EMT by the end of the week to contact our office.  
Placed referral      Would recommend Dr. Brad Vasquez, DO    
Please see message below and advise.   
Detail Level: Simple
Hide Additional Notes?: No
Detail Level: Generalized

## 2023-05-22 ENCOUNTER — APPOINTMENT (OUTPATIENT)
Dept: URBAN - METROPOLITAN AREA SURGERY 9 | Age: 78
Setting detail: DERMATOLOGY
End: 2023-05-23

## 2023-05-22 DIAGNOSIS — L43.8 OTHER LICHEN PLANUS: ICD-10-CM

## 2023-05-22 DIAGNOSIS — Z85.828 PERSONAL HISTORY OF OTHER MALIGNANT NEOPLASM OF SKIN: ICD-10-CM

## 2023-05-22 DIAGNOSIS — L57.0 ACTINIC KERATOSIS: ICD-10-CM

## 2023-05-22 DIAGNOSIS — L81.4 OTHER MELANIN HYPERPIGMENTATION: ICD-10-CM

## 2023-05-22 DIAGNOSIS — L82.1 OTHER SEBORRHEIC KERATOSIS: ICD-10-CM

## 2023-05-22 PROCEDURE — OTHER PRESCRIPTION MEDICATION MANAGEMENT: OTHER

## 2023-05-22 PROCEDURE — 99214 OFFICE O/P EST MOD 30 MIN: CPT

## 2023-05-22 PROCEDURE — OTHER REASSURANCE: OTHER

## 2023-05-22 PROCEDURE — OTHER MIPS QUALITY: OTHER

## 2023-05-22 PROCEDURE — OTHER SUNSCREEN RECOMMENDATIONS: OTHER

## 2023-05-22 ASSESSMENT — LOCATION SIMPLE DESCRIPTION DERM
LOCATION SIMPLE: UPPER BACK
LOCATION SIMPLE: RIGHT FOREHEAD
LOCATION SIMPLE: LEFT PRETIBIAL REGION
LOCATION SIMPLE: POSTERIOR SCALP
LOCATION SIMPLE: NOSE
LOCATION SIMPLE: LEFT NOSE
LOCATION SIMPLE: RIGHT PRETIBIAL REGION

## 2023-05-22 ASSESSMENT — LOCATION DETAILED DESCRIPTION DERM
LOCATION DETAILED: RIGHT DISTAL PRETIBIAL REGION
LOCATION DETAILED: LEFT NASAL ALA
LOCATION DETAILED: NASAL SUPRATIP
LOCATION DETAILED: RIGHT FOREHEAD
LOCATION DETAILED: POSTERIOR MID-PARIETAL SCALP
LOCATION DETAILED: LEFT DISTAL PRETIBIAL REGION
LOCATION DETAILED: INFERIOR THORACIC SPINE
LOCATION DETAILED: SUPERIOR THORACIC SPINE

## 2023-05-22 ASSESSMENT — LOCATION ZONE DERM
LOCATION ZONE: TRUNK
LOCATION ZONE: LEG
LOCATION ZONE: SCALP
LOCATION ZONE: NOSE
LOCATION ZONE: FACE

## 2023-05-22 NOTE — HPI: EVALUATION OF SKIN LESION(S)
Hpi Title: Evaluation of Skin Lesions
Additional History: Patient requesting treatment option for rough skin on nose.

## 2023-05-22 NOTE — PROCEDURE: PRESCRIPTION MEDICATION MANAGEMENT
Under good control with current therapy.  
Continue Regimen: Clobetasol ointment as needed. Previously only needed a few times a year. Did not need it over the last year.
Render In Strict Bullet Format?: No
Detail Level: Zone
Initiate Treatment: 5FU Apply once-twice a day for 2-3 wks to spot on right upper forehead and sides of nose
Detail Level: Detailed

## 2023-06-28 ENCOUNTER — APPOINTMENT (OUTPATIENT)
Dept: URBAN - METROPOLITAN AREA SURGERY 9 | Age: 78
Setting detail: DERMATOLOGY
End: 2023-06-28

## 2023-06-28 DIAGNOSIS — L259 CONTACT DERMATITIS AND OTHER ECZEMA, UNSPECIFIED CAUSE: ICD-10-CM

## 2023-06-28 DIAGNOSIS — D69.2 OTHER NONTHROMBOCYTOPENIC PURPURA: ICD-10-CM

## 2023-06-28 PROBLEM — L23.9 ALLERGIC CONTACT DERMATITIS, UNSPECIFIED CAUSE: Status: ACTIVE | Noted: 2023-06-28

## 2023-06-28 PROCEDURE — OTHER MIPS QUALITY: OTHER

## 2023-06-28 PROCEDURE — OTHER OTHER: OTHER

## 2023-06-28 PROCEDURE — OTHER REASSURANCE: OTHER

## 2023-06-28 PROCEDURE — OTHER PRESCRIPTION: OTHER

## 2023-06-28 PROCEDURE — OTHER PRESCRIPTION MEDICATION MANAGEMENT: OTHER

## 2023-06-28 PROCEDURE — 99213 OFFICE O/P EST LOW 20 MIN: CPT

## 2023-06-28 RX ORDER — ALCLOMETASONE DIPROPIONATE 0.5 MG/G
CREAM TOPICAL
Qty: 15 | Refills: 0 | Status: ERX | COMMUNITY
Start: 2023-06-28

## 2023-06-28 ASSESSMENT — LOCATION SIMPLE DESCRIPTION DERM
LOCATION SIMPLE: LEFT FOREARM
LOCATION SIMPLE: CHEST

## 2023-06-28 ASSESSMENT — LOCATION ZONE DERM
LOCATION ZONE: ARM
LOCATION ZONE: TRUNK

## 2023-06-28 ASSESSMENT — LOCATION DETAILED DESCRIPTION DERM
LOCATION DETAILED: UPPER STERNUM
LOCATION DETAILED: LEFT PROXIMAL DORSAL FOREARM

## 2023-06-28 NOTE — PROCEDURE: MIPS QUALITY
Name And Contact Information For Health Care Proxy: Deandre Contreras [phone] Name And Contact Information For Health Care Proxy: Deandre Contreras [phone]

## 2023-06-28 NOTE — HPI: SKIN LESION
What Type Of Note Output Would You Prefer (Optional)?: Standard Output
Will discuss at 6/27 ov
Has Your Skin Lesion Been Treated?: not been treated
Is This A New Presentation, Or A Follow-Up?: Skin Lesion

## 2023-06-28 NOTE — PROCEDURE: PRESCRIPTION MEDICATION MANAGEMENT
Detail Level: Zone
Render In Strict Bullet Format?: No
Initiate Treatment: Alclometasone cream bid for up 2 weeks and stop , then use for flares when needed

## 2023-06-28 NOTE — PROCEDURE: OTHER
Detail Level: Simple
Other (Free Text): Stop the tea tree products, avoid metals and fragrances
Render Risk Assessment In Note?: no
Note Text (......Xxx Chief Complaint.): This diagnosis correlates with the

## 2024-01-18 ENCOUNTER — APPOINTMENT (OUTPATIENT)
Dept: URBAN - METROPOLITAN AREA SURGERY 9 | Age: 79
Setting detail: DERMATOLOGY
End: 2024-01-18

## 2024-01-18 DIAGNOSIS — L81.4 OTHER MELANIN HYPERPIGMENTATION: ICD-10-CM

## 2024-01-18 DIAGNOSIS — L43.8 OTHER LICHEN PLANUS: ICD-10-CM

## 2024-01-18 DIAGNOSIS — Z85.828 PERSONAL HISTORY OF OTHER MALIGNANT NEOPLASM OF SKIN: ICD-10-CM

## 2024-01-18 DIAGNOSIS — L57.0 ACTINIC KERATOSIS: ICD-10-CM

## 2024-01-18 DIAGNOSIS — L82.1 OTHER SEBORRHEIC KERATOSIS: ICD-10-CM

## 2024-01-18 PROCEDURE — OTHER PRESCRIPTION MEDICATION MANAGEMENT: OTHER

## 2024-01-18 PROCEDURE — 99214 OFFICE O/P EST MOD 30 MIN: CPT

## 2024-01-18 PROCEDURE — OTHER SUNSCREEN RECOMMENDATIONS: OTHER

## 2024-01-18 PROCEDURE — OTHER MIPS QUALITY: OTHER

## 2024-01-18 PROCEDURE — OTHER REASSURANCE: OTHER

## 2024-01-18 ASSESSMENT — LOCATION SIMPLE DESCRIPTION DERM
LOCATION SIMPLE: RIGHT FOREHEAD
LOCATION SIMPLE: POSTERIOR SCALP
LOCATION SIMPLE: NOSE
LOCATION SIMPLE: LEFT PRETIBIAL REGION
LOCATION SIMPLE: UPPER BACK
LOCATION SIMPLE: LEFT FOREHEAD
LOCATION SIMPLE: RIGHT PRETIBIAL REGION
LOCATION SIMPLE: LEFT NOSE

## 2024-01-18 ASSESSMENT — LOCATION DETAILED DESCRIPTION DERM
LOCATION DETAILED: NASAL DORSUM
LOCATION DETAILED: RIGHT DISTAL PRETIBIAL REGION
LOCATION DETAILED: LEFT LATERAL DISTAL PRETIBIAL REGION
LOCATION DETAILED: LEFT SUPERIOR LATERAL FOREHEAD
LOCATION DETAILED: RIGHT SUPERIOR FOREHEAD
LOCATION DETAILED: INFERIOR THORACIC SPINE
LOCATION DETAILED: SUPERIOR THORACIC SPINE
LOCATION DETAILED: NASAL SUPRATIP
LOCATION DETAILED: POSTERIOR MID-PARIETAL SCALP
LOCATION DETAILED: LEFT NASAL ALA

## 2024-01-18 ASSESSMENT — LOCATION ZONE DERM
LOCATION ZONE: FACE
LOCATION ZONE: NOSE
LOCATION ZONE: LEG
LOCATION ZONE: SCALP
LOCATION ZONE: TRUNK

## 2024-01-18 NOTE — PROCEDURE: PRESCRIPTION MEDICATION MANAGEMENT
Initiate Treatment: 5 FU Cream (has at home) to spots on forehead and nose once a day  for 2 to 3 weeks as directed.
Detail Level: Zone
Render In Strict Bullet Format?: No

## 2024-07-19 ENCOUNTER — APPOINTMENT (OUTPATIENT)
Dept: URBAN - METROPOLITAN AREA SURGERY 9 | Age: 79
Setting detail: DERMATOLOGY
End: 2024-07-19

## 2024-07-19 DIAGNOSIS — L81.4 OTHER MELANIN HYPERPIGMENTATION: ICD-10-CM

## 2024-07-19 DIAGNOSIS — L57.0 ACTINIC KERATOSIS: ICD-10-CM

## 2024-07-19 DIAGNOSIS — Z85.828 PERSONAL HISTORY OF OTHER MALIGNANT NEOPLASM OF SKIN: ICD-10-CM

## 2024-07-19 DIAGNOSIS — L43.8 OTHER LICHEN PLANUS: ICD-10-CM

## 2024-07-19 DIAGNOSIS — L82.1 OTHER SEBORRHEIC KERATOSIS: ICD-10-CM

## 2024-07-19 PROBLEM — D48.5 NEOPLASM OF UNCERTAIN BEHAVIOR OF SKIN: Status: ACTIVE | Noted: 2024-07-19

## 2024-07-19 PROCEDURE — OTHER PRESCRIPTION: OTHER

## 2024-07-19 PROCEDURE — OTHER REASSURANCE: OTHER

## 2024-07-19 PROCEDURE — 99214 OFFICE O/P EST MOD 30 MIN: CPT

## 2024-07-19 PROCEDURE — OTHER OBSERVATION: OTHER

## 2024-07-19 PROCEDURE — OTHER PRESCRIPTION MEDICATION MANAGEMENT: OTHER

## 2024-07-19 PROCEDURE — OTHER SUNSCREEN RECOMMENDATIONS: OTHER

## 2024-07-19 PROCEDURE — OTHER MIPS QUALITY: OTHER

## 2024-07-19 RX ORDER — FLUOROURACIL 5 MG/G
CREAM TOPICAL
Qty: 40 | Refills: 0 | Status: ERX | COMMUNITY
Start: 2024-07-19

## 2024-07-19 RX ORDER — CLOBETASOL PROPIONATE 0.5 MG/G
OINTMENT TOPICAL
Qty: 15 | Refills: 1 | Status: ERX

## 2024-07-19 ASSESSMENT — LOCATION SIMPLE DESCRIPTION DERM
LOCATION SIMPLE: LEFT LIP
LOCATION SIMPLE: POSTERIOR SCALP
LOCATION SIMPLE: UPPER BACK
LOCATION SIMPLE: NOSE
LOCATION SIMPLE: RIGHT PRETIBIAL REGION
LOCATION SIMPLE: LEFT NOSE
LOCATION SIMPLE: LEFT PRETIBIAL REGION

## 2024-07-19 ASSESSMENT — LOCATION ZONE DERM
LOCATION ZONE: NOSE
LOCATION ZONE: LEG
LOCATION ZONE: TRUNK
LOCATION ZONE: LIP
LOCATION ZONE: SCALP

## 2024-07-19 ASSESSMENT — LOCATION DETAILED DESCRIPTION DERM
LOCATION DETAILED: RIGHT DISTAL PRETIBIAL REGION
LOCATION DETAILED: POSTERIOR MID-PARIETAL SCALP
LOCATION DETAILED: LEFT NASAL ALA
LOCATION DETAILED: NASAL DORSUM
LOCATION DETAILED: INFERIOR THORACIC SPINE
LOCATION DETAILED: LEFT DISTAL PRETIBIAL REGION
LOCATION DETAILED: NASAL SUPRATIP
LOCATION DETAILED: LEFT PHILTRAL RIDGE
LOCATION DETAILED: SUPERIOR THORACIC SPINE

## 2024-07-19 NOTE — PROCEDURE: PRESCRIPTION MEDICATION MANAGEMENT
Initiate Treatment: 5FU Apply BID for 2-3 wks to spot on nose
Render In Strict Bullet Format?: No
Detail Level: Detailed
Continue Regimen: Clobetasol ointment as needed. Only using a few times a year
Detail Level: Zone

## 2024-12-02 NOTE — HPI: EVALUATION OF SKIN LESION(S)
Hpi Title: Evaluation of Skin Lesions Render Note In Bullet Format When Appropriate: No Number Of Freeze-Thaw Cycles: 1 freeze-thaw cycle Show Applicator Variable?: Yes Duration Of Freeze Thaw-Cycle (Seconds): 5 Detail Level: Simple Post-Care Instructions: Pt may apply Vaseline to crusted or scabbing areas. Consent: The patient's consent was obtained including but not limited to risks of crusting, blistering, scarring, pigmentary change.